# Patient Record
Sex: FEMALE | Race: WHITE | NOT HISPANIC OR LATINO | Employment: UNEMPLOYED | URBAN - METROPOLITAN AREA
[De-identification: names, ages, dates, MRNs, and addresses within clinical notes are randomized per-mention and may not be internally consistent; named-entity substitution may affect disease eponyms.]

---

## 2024-11-28 ENCOUNTER — HOSPITAL ENCOUNTER (EMERGENCY)
Facility: HOSPITAL | Age: 39
Discharge: HOME | End: 2024-11-28
Attending: EMERGENCY MEDICINE

## 2024-11-28 VITALS
HEART RATE: 79 BPM | DIASTOLIC BLOOD PRESSURE: 70 MMHG | TEMPERATURE: 96.4 F | BODY MASS INDEX: 31.01 KG/M2 | HEIGHT: 63 IN | OXYGEN SATURATION: 100 % | RESPIRATION RATE: 16 BRPM | SYSTOLIC BLOOD PRESSURE: 126 MMHG | WEIGHT: 175 LBS

## 2024-11-28 DIAGNOSIS — G43.009 MIGRAINE WITHOUT AURA AND WITHOUT STATUS MIGRAINOSUS, NOT INTRACTABLE: Primary | ICD-10-CM

## 2024-11-28 LAB — B-HCG SERPL-ACNC: <2 MIU/ML

## 2024-11-28 PROCEDURE — 96374 THER/PROPH/DIAG INJ IV PUSH: CPT

## 2024-11-28 PROCEDURE — 99284 EMERGENCY DEPT VISIT MOD MDM: CPT | Performed by: EMERGENCY MEDICINE

## 2024-11-28 PROCEDURE — 84702 CHORIONIC GONADOTROPIN TEST: CPT | Performed by: STUDENT IN AN ORGANIZED HEALTH CARE EDUCATION/TRAINING PROGRAM

## 2024-11-28 PROCEDURE — 99284 EMERGENCY DEPT VISIT MOD MDM: CPT | Mod: 25

## 2024-11-28 PROCEDURE — 2500000004 HC RX 250 GENERAL PHARMACY W/ HCPCS (ALT 636 FOR OP/ED): Performed by: STUDENT IN AN ORGANIZED HEALTH CARE EDUCATION/TRAINING PROGRAM

## 2024-11-28 PROCEDURE — 36415 COLL VENOUS BLD VENIPUNCTURE: CPT | Performed by: STUDENT IN AN ORGANIZED HEALTH CARE EDUCATION/TRAINING PROGRAM

## 2024-11-28 PROCEDURE — 96361 HYDRATE IV INFUSION ADD-ON: CPT

## 2024-11-28 PROCEDURE — 96375 TX/PRO/DX INJ NEW DRUG ADDON: CPT

## 2024-11-28 RX ORDER — METOCLOPRAMIDE HYDROCHLORIDE 5 MG/ML
10 INJECTION INTRAMUSCULAR; INTRAVENOUS ONCE
Status: COMPLETED | OUTPATIENT
Start: 2024-11-28 | End: 2024-11-28

## 2024-11-28 RX ORDER — DIPHENHYDRAMINE HYDROCHLORIDE 50 MG/ML
25 INJECTION INTRAMUSCULAR; INTRAVENOUS ONCE
Status: COMPLETED | OUTPATIENT
Start: 2024-11-28 | End: 2024-11-28

## 2024-11-28 RX ORDER — KETOROLAC TROMETHAMINE 30 MG/ML
30 INJECTION, SOLUTION INTRAMUSCULAR; INTRAVENOUS ONCE
Status: COMPLETED | OUTPATIENT
Start: 2024-11-28 | End: 2024-11-28

## 2024-11-28 ASSESSMENT — PAIN - FUNCTIONAL ASSESSMENT
PAIN_FUNCTIONAL_ASSESSMENT: 0-10
PAIN_FUNCTIONAL_ASSESSMENT: 0-10

## 2024-11-28 ASSESSMENT — PAIN DESCRIPTION - LOCATION
LOCATION: HEAD
LOCATION: HEAD

## 2024-11-28 ASSESSMENT — PAIN SCALES - GENERAL
PAINLEVEL_OUTOF10: 6
PAINLEVEL_OUTOF10: 0 - NO PAIN

## 2024-11-28 ASSESSMENT — COLUMBIA-SUICIDE SEVERITY RATING SCALE - C-SSRS
2. HAVE YOU ACTUALLY HAD ANY THOUGHTS OF KILLING YOURSELF?: NO
6. HAVE YOU EVER DONE ANYTHING, STARTED TO DO ANYTHING, OR PREPARED TO DO ANYTHING TO END YOUR LIFE?: NO
1. IN THE PAST MONTH, HAVE YOU WISHED YOU WERE DEAD OR WISHED YOU COULD GO TO SLEEP AND NOT WAKE UP?: NO

## 2024-11-28 ASSESSMENT — PAIN DESCRIPTION - PAIN TYPE: TYPE: ACUTE PAIN

## 2024-11-28 NOTE — ED PROVIDER NOTES
EMERGENCY DEPARTMENT ENCOUNTER      Pt Name: Margarita Barnes  MRN: [id number]  Birthdate 1985  Date of evaluation: 11/28/2024  Provider: Hattie Singletary MD    CHIEF COMPLAINT       Chief Complaint   Patient presents with    Headache    Vomiting     Started a few hours ago. Patient thought it was due to her period and took PMS medication with no relief         HISTORY OF PRESENT ILLNESS    A 39-year-old female past medical history of PCOS and migraines comes in due to a unilateral pounding 7/10 headache that started today morning.  Patient reported subjective fever/chills that started today morning as well as nasal/sinus congestion with rhinorrhea and vomiting that started 2 hours ago.  She took Excedrin at home without improvement of her symptoms.  Denies chest/abdominal pain, no OCPs use or birth control, no diarrhea, no visual changes.         Nursing Notes were reviewed.    PAST MEDICAL HISTORY   History reviewed. No pertinent past medical history.      SURGICAL HISTORY     History reviewed. No pertinent surgical history.      CURRENT MEDICATIONS       Previous Medications    No medications on file       ALLERGIES     Patient has no known allergies.    FAMILY HISTORY     No family history on file.       SOCIAL HISTORY       Social History     Socioeconomic History    Marital status: Single   Tobacco Use    Smoking status: Every Day     Types: Cigarettes    Smokeless tobacco: Never   Substance and Sexual Activity    Drug use: Never       SCREENINGS                        PHYSICAL EXAM    (up to 7 for level 4, 8 or more for level 5)     ED Triage Vitals   Temperature Heart Rate Respirations BP   11/28/24 1616 11/28/24 1616 11/28/24 1616 11/28/24 1619   35.8 °C (96.4 °F) (!) 104 18 (!) 150/98      Pulse Ox Temp Source Heart Rate Source Patient Position   11/28/24 1616 11/28/24 1616 -- 11/28/24 1619   100 % Temporal  Sitting      BP Location FiO2 (%)     11/28/24 1619 --     Right arm        Physical  Exam  Constitutional:       Appearance: She is not ill-appearing, toxic-appearing or diaphoretic.   HENT:      Head: Normocephalic and atraumatic.      Right Ear: Tympanic membrane, ear canal and external ear normal. There is no impacted cerumen.      Left Ear: Ear canal and external ear normal. There is no impacted cerumen.      Ears:      Comments: Erythematosus tympanic membrane with no effusion  Eyes:      Extraocular Movements: Extraocular movements intact.      Pupils: Pupils are equal, round, and reactive to light.   Cardiovascular:      Rate and Rhythm: Normal rate and regular rhythm.      Heart sounds: Normal heart sounds.   Pulmonary:      Effort: Pulmonary effort is normal. No respiratory distress.      Breath sounds: Normal breath sounds. No stridor. No wheezing, rhonchi or rales.   Chest:      Chest wall: No tenderness.   Abdominal:      General: Bowel sounds are normal.      Palpations: Abdomen is soft.   Musculoskeletal:         General: No swelling or tenderness. Normal range of motion.      Cervical back: Normal range of motion and neck supple. No rigidity.   Skin:     General: Skin is warm and dry.      Coloration: Skin is not cyanotic or pale.      Findings: No erythema or rash.   Neurological:      Mental Status: She is alert and oriented to person, place, and time.      GCS: GCS eye subscore is 4. GCS verbal subscore is 5. GCS motor subscore is 6.   Psychiatric:         Mood and Affect: Affect is tearful.          DIAGNOSTIC RESULTS     LABS:  Labs Reviewed   HUMAN CHORIONIC GONADOTROPIN, SERUM QUANTITATIVE       All other labs were within normal range or not returned as of this dictation.    Imaging  CT head wo IV contrast    (Results Pending)   CT sinus w and wo IV contrast    (Results Pending)        Procedures  Procedures     EMERGENCY DEPARTMENT COURSE/MDM:     Diagnoses as of 11/28/24 1819   Migraine without aura and without status migrainosus, not intractable        Medical Decision  Making  A 39-year-old female comes in due to a migraine.  Patient is mild tachycardic and hypertensive most likely due to pain.  Pregnancy test is negative.  1L IV fluid and migraine cocktail is given without significant improvement of symptoms.  Patient hemodynamically stable, normotensive, nontachycardic, afebrile and saturating well on room air.  Patient is discharged home with PCP follow-up and return precautions.      Hypertensive and tachycardic.  Febrile saturating well on room air  Patient and or family in agreement and understanding of treatment plan.  All questions answered.      I reviewed the case with the attending ED physician. The attending ED physician agrees with the plan. Patient and/or patient´s representative was counseled regarding labs, imaging, likely diagnosis, and plan. All questions were answered.    ED Medications administered this visit:    Medications   diphenhydrAMINE (BENADryl) injection 25 mg (has no administration in time range)   ketorolac (Toradol) injection 30 mg (has no administration in time range)   metoclopramide (Reglan) injection 10 mg (has no administration in time range)   sodium chloride 0.9 % bolus 1,000 mL (has no administration in time range)   dexAMETHasone (Decadron) injection 4 mg (has no administration in time range)       New Prescriptions from this visit:    New Prescriptions    No medications on file       Follow-up:  No follow-up provider specified.      Final Impression: No diagnosis found.      (Please note that portions of this note were completed with a voice recognition program.  Efforts were made to edit the dictations but occasionally words are mis-transcribed.)     Hattie Singletary MD  Resident  11/28/24 1916      The patient was seen by the resident/fellow.  I have personally performed a substantive portion of the encounter.  I have seen and examined the patient; agree with the workup, evaluation, MDM, management and diagnosis.  The care plan  "has been discussed with the resident; I have reviewed the resident’s note and agree with the documented findings.      I saw this patient with the resident for headache.  This is a young female with no past medical history complaining of headache.  Patient states it is over her eye and pounding.  She has photophobia along with nausea.    Physical exam patient is awake alert neck is soft and supple.  No meningeal signs.  No neurological deficits.  Cranial nerves are intact.  Patient does have photophobia.  Heart rate and rhythm is regular.  Lungs are bilaterally clear.  Abdomen is soft.  Resident says she thought the tympanic membrane was slightly erythematous on the right.  I agree but it does not look like a classic otitis media.    Medical decision making: We will treat the patient as a migraine.  Then reassess the patient.  We feel that that tympanic membrane is slightly edematous but not classic for otitis media.  We offered a CAT scan but the patient states she has no insurance and would like to forego that at this time.  Risk versus benefits were explained.  Patient understands.    After fluids and migraine cocktail patient is significantly improved and she said \"you are a miracle worker\".  Patient is going to get a ride by the mother.                                                  Alfred Briseno, DO  11/30/24 1044    "

## 2024-11-28 NOTE — DISCHARGE INSTRUCTIONS
Patient is to follow-up with a primary care doctor in 2 to 3 days.  Patient is to be careful such as driving or walking up and down stairs due to the medications given in the emergency department.  Patient is return to emergency department if he feels worse.

## 2025-05-28 ENCOUNTER — OFFICE VISIT (OUTPATIENT)
Dept: URGENT CARE | Age: 40
End: 2025-05-28

## 2025-05-28 VITALS
BODY MASS INDEX: 30.73 KG/M2 | DIASTOLIC BLOOD PRESSURE: 88 MMHG | HEART RATE: 85 BPM | TEMPERATURE: 98.8 F | OXYGEN SATURATION: 100 % | HEIGHT: 64 IN | RESPIRATION RATE: 16 BRPM | SYSTOLIC BLOOD PRESSURE: 122 MMHG | WEIGHT: 180 LBS

## 2025-05-28 DIAGNOSIS — J02.9 VIRAL PHARYNGITIS: Primary | ICD-10-CM

## 2025-05-28 LAB — POC RAPID STREP: NEGATIVE

## 2025-05-28 PROCEDURE — 87880 STREP A ASSAY W/OPTIC: CPT | Performed by: FAMILY MEDICINE

## 2025-05-28 PROCEDURE — 99213 OFFICE O/P EST LOW 20 MIN: CPT | Performed by: FAMILY MEDICINE

## 2025-05-28 PROCEDURE — 3008F BODY MASS INDEX DOCD: CPT | Performed by: FAMILY MEDICINE

## 2025-05-28 RX ORDER — PREDNISONE 20 MG/1
20 TABLET ORAL 2 TIMES DAILY
Qty: 8 TABLET | Refills: 0 | Status: SHIPPED | OUTPATIENT
Start: 2025-05-28 | End: 2025-06-01

## 2025-05-28 ASSESSMENT — PAIN SCALES - GENERAL: PAINLEVEL_OUTOF10: 5

## 2025-05-28 NOTE — PATIENT INSTRUCTIONS
Your strep test was negative.    You have an upper respiratory infection. Mostly, these are caused by viruses and treatment is as follows. Symptoms may last for up to 1-2 weeks, but follow up with PCP if your symptoms start worsening.  For muscle aches, fever, chills: Acetaminophen or Ibuprofen, Advil, or Aleve can be used.  Hydration: Maintain adequate hydration with water.  Nasal symptoms: Nasal Saline available over-the-counter, can be used 3-4 times per day  Decongestants reduce nasal congestion and discharge  Vaseline at opening of nares may reduce irritation   Cool Mist Humidifier may loosens discharge  Cough Suppressants or expectorants may be taken over-the-counter     Antibiotics are not indicated for treatment, as antibiotics do not treat viruses.      Use a steroid anti-inflammatory as directed.     Hand hygiene is important, please wash your hands before and after coming in contact with other people.  Cover your mouth when coughing or sneezing to help reduce the spread.

## 2025-05-28 NOTE — PROGRESS NOTES
"Subjective   Patient ID: Margarita Barnes is a 39 y.o. female. They present today with a chief complaint of Sore Throat (Began yesterday).    Patient disposition: Home    History of Present Illness  HPI  1 day of sore throat, runny nose.  No fever or chills.  No headache.  Taking DayQuil and NyQuil.  No sick contacts.  No seasonal allergies.  No history of asthma or bronchitis.  No GI symptoms.  Initially woke up this morning and noticed white spots in the back of her throat.  No other complaints or symptoms      Past Medical History  Allergies as of 05/28/2025    (No Known Allergies)       Prescriptions Prior to Admission[1]     Current Medications[2]    Problem List[3]    Surgical History[4]     reports that she has quit smoking. Her smoking use included cigarettes. She has never used smokeless tobacco. She reports that she does not use drugs.    Review of Systems  As noted in HPI. ROS otherwise negative unless noted.       Objective    Vitals:    05/28/25 1004   BP: 122/88   Pulse: 85   Resp: 16   Temp: 37.1 °C (98.8 °F)   TempSrc: Oral   SpO2: 100%   Weight: 81.6 kg (180 lb)   Height: 1.626 m (5' 4\")     Patient's last menstrual period was 05/16/2025 (approximate).    Physical Exam  Constitutional: vital signs reviewed. Well developed, well nourished. patient alert and patient without distress.   Head and Face: Normal and atraumatic.    Ears, Nose, Mouth, and Throat:   Hearing: Normal.  External inspection of nose: Normal.   Lips, teeth, tongue and gums: Normal and well hydrated. External inspection of ears: Normal. Ear canals and TMs: Normal.  Posterior pharynx moist, no exudate, symmetric, no abscess, and with post nasal drip, injected.  No exudate noticed.  Lymphatic: No cervical lymphadenopathy  Neck: No neck mass was observed. Supple. normal muscle tone.   Cardiovascular: Heart rate normal, normal S1 and S2, no gallops, no murmurs and no pericardial rub. Rhythm: Normal.  Pulmonary: No respiratory distress. " Palpation of chest: Normal. Clear bilateral breath sounds.   Psych: Normal mood and affect        Procedures    Point of Care Test & Imaging Results from this visit  Results for orders placed or performed in visit on 05/28/25   POCT rapid strep A manually resulted    Collection Time: 05/28/25 10:16 AM   Result Value Ref Range    POC Rapid Strep Negative Negative            Diagnostic study results (if any) were reviewed.  (If applicable) preliminary radiology reading: None    Assessment/Plan   Allergies, medications, history, and pertinent labs/EKGs/Imaging reviewed.        Medical Decision Making  See note    Orders and Diagnoses  Diagnoses and all orders for this visit:  Sore throat  -     POCT rapid strep A manually resulted      Medical Admin Record      Follow Up Instructions  No follow-ups on file.    At time of discharge patient was clinically well-appearing and HDS for outpatient management. The patient and/or family was educated regarding diagnosis, supportive care, OTC and Rx medications. The patient and/or family was given the opportunity to ask questions prior to discharge and all questions answered. They verbalized understanding of my discussion of the plans for treatment, expected course, indications to return to  or seek further evaluation in ED, and the need for timely follow up as directed.      Electronically signed by [facility]           [1] (Not in a hospital admission)  [2]   No current outpatient medications on file.     No current facility-administered medications for this visit.   [3] There is no problem list on file for this patient.  [4] No past surgical history on file.